# Patient Record
Sex: FEMALE | URBAN - METROPOLITAN AREA
[De-identification: names, ages, dates, MRNs, and addresses within clinical notes are randomized per-mention and may not be internally consistent; named-entity substitution may affect disease eponyms.]

---

## 2018-11-19 ENCOUNTER — EMERGENCY (EMERGENCY)
Facility: HOSPITAL | Age: 23
LOS: 1 days | Discharge: HOME | End: 2018-11-19
Attending: EMERGENCY MEDICINE | Admitting: EMERGENCY MEDICINE
Payer: SELF-PAY

## 2018-11-19 VITALS
RESPIRATION RATE: 16 BRPM | SYSTOLIC BLOOD PRESSURE: 122 MMHG | DIASTOLIC BLOOD PRESSURE: 72 MMHG | TEMPERATURE: 98 F | HEART RATE: 97 BPM | OXYGEN SATURATION: 100 %

## 2018-11-19 PROCEDURE — 99283 EMERGENCY DEPT VISIT LOW MDM: CPT

## 2018-11-19 NOTE — ED PROVIDER NOTE - PROGRESS NOTE DETAILS
BRENDA Samuel: Received sign out from BRENDA ROBERTSON to reassess patient and follow up on dental rec'. Per dental, fever likely 2/2 eruption molars. Pt also has gingivitis, recommend Motrin. Will dc w/ oral surgery follow up

## 2018-11-19 NOTE — ED ADULT TRIAGE NOTE - CHIEF COMPLAINT QUOTE
pt comes to ED for dental pain since friday. pt thinks her wisdom teeth is coming out. pt VSS pt appears comfortable NAD

## 2018-11-19 NOTE — ED PROVIDER NOTE - OBJECTIVE STATEMENT
24 y/o F w/ no significant PMHx presents to ED c/o x5days of intermittent fevers Tmax 103F as well as worsening BL dental pain radiating throughout her head. States pain is worse on the rt side. Pt taking Tylenol and Motrin; last at 4:00PM. Denies other complaints. NKDA. No regular medications.

## 2018-11-19 NOTE — ED PROVIDER NOTE - MEDICAL DECISION MAKING DETAILS
24 y/o F w/ dental pain and intermittent fevers. Plan -Have [facility] dental evaluate for dental abscess.

## 2018-11-19 NOTE — PROGRESS NOTE ADULT - SUBJECTIVE AND OBJECTIVE BOX
Patient is a 23y old  Female who presents with a chief complaint of wisdom tooth pain in LL.     HPI: Pt. presents to ED with mom complaining of wisdom tooth pain in LL and UL. Pt. states pain started last Wednesday and worsened on Thursday. She states she has had a fever since Thursday, high temp of 103' F. She states the pain is constant and throbbing. She states it is worse with chewing and cold air and drinks; states pain is slightly relieved with tylenol and motrin. Pt. states she has not been to a dentist in about 2 years. Pt. denies difficulty breathing or swallowing.       PAST MEDICAL & SURGICAL HISTORY: none      MEDICATIONS  (STANDING): none    MEDICATIONS  (PRN): none      Allergies    *Last Dental Visit: over 2 years ago    Vital Signs Last 24 Hrs  T(C): 36.8 (19 Nov 2018 17:39), Max: 36.8 (19 Nov 2018 17:39)  T(F): 98.2 (19 Nov 2018 17:39), Max: 98.2 (19 Nov 2018 17:39)  HR: 97 (19 Nov 2018 17:39) (97 - 97)  BP: 122/72 (19 Nov 2018 17:39) (122/72 - 122/72)  BP(mean): --  RR: 16 (19 Nov 2018 17:39) (16 - 16)  SpO2: 100% (19 Nov 2018 17:39) (100% - 100%)    EOE:  TMJ ( -  ) clicks                    ( -   ) pops                    ( -   ) crepitus             Mandible <<ltd ROM; about 10mm on own, 20mm assisted.              Facial bones and MOM <<grossly intact>>             ( +  ) trismus             ( +  ) LAD - R and L submandibular LAD appreciated             ( -  ) swelling             ( -  ) asymmetry             (  + ) palpation - tender to palpation over LR angle of mandible             (  - ) SOB             ( -  ) dysphagia             ( -  ) LOC    IOE:  <<permanent>> dentition: <<grossly intact>>           hard/soft palate:  WNL           tongue/FOM <<WNL>>           labial/buccal mucosa <<WNL>>           ( -  ) percussion           ( +  ) palpation - B and L gingiva tender to palpation in LR posterior; B and palatal gingiva tender to palpation in UR posterior           ( -  ) swelling   No caries visible clinically, no mobility. Generalized gingivitis.   Fully erupted #1 not in occlusion with opposing dentition or soft tissue. #16, 17, 32 not present clinically. No soft tissue overlying erupted teeth.     *DENTAL RADIOGRAPHS: Panorex - Permanent dentition, grossly intact. Unerupted #16, 17, 32. #17 mesially impacted. #32 slightly mesially impacted. No PAP, no caries visible.     ASSESSMENT: Erupting impacted #32 with associated pain. No signs of acute infection at this time.     PROCEDURE:  EOE, IOE, rads. Discussed findings with patient. No treatment indicated at this time. Recommended f/u with outpatient OMFS for extraction of wisdom teeth. All questions answered.     RECOMMENDATIONS:  1) Soft diet, Motrin for pain.   2) Dental F/U with outpatient dentist for comprehensive dental care & with outpatient [facility] for extraction of 3rd molars.   3) If any difficulty swallowing/breathing, fever occur, return to ED.     Randa Stewart, DMD; [id number]

## 2018-11-19 NOTE — ED PROVIDER NOTE - NS_ ATTENDINGSCRIBEDETAILS _ED_A_ED_FT
Dr. Farrar:  I personally performed the services described in the documentation, reviewed the documentation recorded by the scribe in my presence and it accurately and completely records my words and action. The scribe's documentation has been prepared under my direction and personally reviewed by me in its entirety. I confirm that the note above accurately reflects all work, treatment, procedures, and medical decision making performed by me.

## 2018-11-19 NOTE — ED PROVIDER NOTE - NSFOLLOWUPINSTRUCTIONS_ED_ALL_ED_FT
See your primary care doctor within 24-48 hours. Follow up with the oral surgeon this week for further evaluation and extraction of your wisdom teeth. Take Motrin 600mg every 8hrs with food for pain or fever. Return to the ER for worsening symptoms or any other concerns.

## 2021-12-05 NOTE — ED PROVIDER NOTE - DISCHARGE REVIEW MATERIAL PRESENTED
. Scribe Attestation (For Scribes USE Only)... Attending Attestation (For Attendings USE Only).../Scribe Attestation (For Scribes USE Only)...

## 2023-01-31 PROBLEM — Z00.00 ENCOUNTER FOR PREVENTIVE HEALTH EXAMINATION: Status: ACTIVE | Noted: 2023-01-31

## 2023-02-01 ENCOUNTER — NON-APPOINTMENT (OUTPATIENT)
Age: 28
End: 2023-02-01

## 2023-02-01 ENCOUNTER — APPOINTMENT (OUTPATIENT)
Dept: CARDIOLOGY | Facility: CLINIC | Age: 28
End: 2023-02-01
Payer: COMMERCIAL

## 2023-02-01 VITALS
HEART RATE: 98 BPM | DIASTOLIC BLOOD PRESSURE: 72 MMHG | SYSTOLIC BLOOD PRESSURE: 115 MMHG | WEIGHT: 114 LBS | BODY MASS INDEX: 20.2 KG/M2 | HEIGHT: 63 IN | OXYGEN SATURATION: 99 %

## 2023-02-01 DIAGNOSIS — R00.2 PALPITATIONS: ICD-10-CM

## 2023-02-01 DIAGNOSIS — Z84.89 FAMILY HISTORY OF OTHER SPECIFIED CONDITIONS: ICD-10-CM

## 2023-02-01 DIAGNOSIS — Z78.9 OTHER SPECIFIED HEALTH STATUS: ICD-10-CM

## 2023-02-01 DIAGNOSIS — Z82.49 FAMILY HISTORY OF ISCHEMIC HEART DISEASE AND OTHER DISEASES OF THE CIRCULATORY SYSTEM: ICD-10-CM

## 2023-02-01 PROCEDURE — 93000 ELECTROCARDIOGRAM COMPLETE: CPT

## 2023-02-01 PROCEDURE — 99204 OFFICE O/P NEW MOD 45 MIN: CPT

## 2023-02-03 ENCOUNTER — APPOINTMENT (OUTPATIENT)
Dept: CARDIOLOGY | Facility: CLINIC | Age: 28
End: 2023-02-03
Payer: COMMERCIAL

## 2023-02-03 ENCOUNTER — LABORATORY RESULT (OUTPATIENT)
Age: 28
End: 2023-02-03

## 2023-02-03 PROCEDURE — 93306 TTE W/DOPPLER COMPLETE: CPT

## 2023-02-05 NOTE — DISCUSSION/SUMMARY
[FreeTextEntry1] : 27 year woman with a history as listed presents for an initial cardiac evaluation. \par Pierre has noted increased heart rate with intermittent sensations of palpitations that somewhat appear arrhythmic in nature.  Her EKG did not show any signs of short OH interval or ectopy.  She will get a Zio Patch to rule out arrhythmias. She will get a 2d echo to assess for any  new structural heart disease, changes in valvular and ventricular function. \par Of concern is her profound weight loss over the last year.  She has signs of thyroid issues. She will have her baseline lab work, including lipids, done prior to the next visit. \par Exercise and diet counseling was performed in order to reduce her future cardiovascular risk. \par She will followup with me in 3-6 months or sooner if necessary.  [EKG obtained to assist in diagnosis and management of assessed problem(s)] : EKG obtained to assist in diagnosis and management of assessed problem(s)

## 2023-02-05 NOTE — HISTORY OF PRESENT ILLNESS
[FreeTextEntry1] : 27 year old woman with no PMH present initial cardiac. \par \par She noted that she started to have palpitations that would occur randomly, occurring daily lasting for about 2-3 minutes, that sudden start and stopped suddenly. She is relatively sedentary. She   denies any chest pain, PND, orthopnea, lower extremity edema, near syncope, syncope, strokelike symptoms. She had unintentional weight loss of 25 lbs in the last year. \par Her menstrual cycle has been normal.

## 2023-02-07 LAB
ALBUMIN SERPL ELPH-MCNC: 3.9 G/DL
ALP BLD-CCNC: 179 U/L
ALT SERPL-CCNC: 37 U/L
ANION GAP SERPL CALC-SCNC: 10 MMOL/L
AST SERPL-CCNC: 21 U/L
BASOPHILS # BLD AUTO: 0.01 K/UL
BASOPHILS NFR BLD AUTO: 0.2 %
BILIRUB SERPL-MCNC: 0.4 MG/DL
BUN SERPL-MCNC: 10 MG/DL
CALCIUM SERPL-MCNC: 10.1 MG/DL
CHLORIDE SERPL-SCNC: 107 MMOL/L
CHOLEST SERPL-MCNC: 131 MG/DL
CO2 SERPL-SCNC: 23 MMOL/L
CREAT SERPL-MCNC: 0.41 MG/DL
EGFR: 136 ML/MIN/1.73M2
EOSINOPHIL # BLD AUTO: 0.16 K/UL
EOSINOPHIL NFR BLD AUTO: 3.2 %
ESTIMATED AVERAGE GLUCOSE: 97 MG/DL
FOLATE SERPL-MCNC: 12.4 NG/ML
GLUCOSE SERPL-MCNC: 95 MG/DL
HBA1C MFR BLD HPLC: 5 %
HCT VFR BLD CALC: 41.2 %
HDLC SERPL-MCNC: 69 MG/DL
HGB BLD-MCNC: 13.1 G/DL
IMM GRANULOCYTES NFR BLD AUTO: 0.2 %
LDLC SERPL CALC-MCNC: 52 MG/DL
LYMPHOCYTES # BLD AUTO: 1.75 K/UL
LYMPHOCYTES NFR BLD AUTO: 34.8 %
MAGNESIUM SERPL-MCNC: 1.8 MG/DL
MAN DIFF?: NORMAL
MCHC RBC-ENTMCNC: 27.5 PG
MCHC RBC-ENTMCNC: 31.8 GM/DL
MCV RBC AUTO: 86.6 FL
MONOCYTES # BLD AUTO: 0.38 K/UL
MONOCYTES NFR BLD AUTO: 7.6 %
NEUTROPHILS # BLD AUTO: 2.72 K/UL
NEUTROPHILS NFR BLD AUTO: 54 %
NONHDLC SERPL-MCNC: 63 MG/DL
PLATELET # BLD AUTO: 248 K/UL
POTASSIUM SERPL-SCNC: 4.4 MMOL/L
PROT SERPL-MCNC: 6.2 G/DL
RBC # BLD: 4.76 M/UL
RBC # FLD: 12.8 %
SODIUM SERPL-SCNC: 140 MMOL/L
TRIGL SERPL-MCNC: 53 MG/DL
TSH SERPL-ACNC: <0.01 UIU/ML
VIT B12 SERPL-MCNC: 425 PG/ML
WBC # FLD AUTO: 5.03 K/UL

## 2023-02-08 ENCOUNTER — NON-APPOINTMENT (OUTPATIENT)
Age: 28
End: 2023-02-08

## 2023-03-01 ENCOUNTER — APPOINTMENT (OUTPATIENT)
Dept: ENDOCRINOLOGY | Facility: CLINIC | Age: 28
End: 2023-03-01
Payer: SUBSIDIZED

## 2023-03-01 VITALS
SYSTOLIC BLOOD PRESSURE: 99 MMHG | OXYGEN SATURATION: 100 % | WEIGHT: 115 LBS | HEART RATE: 70 BPM | BODY MASS INDEX: 20.37 KG/M2 | DIASTOLIC BLOOD PRESSURE: 62 MMHG

## 2023-03-01 PROCEDURE — 99204 OFFICE O/P NEW MOD 45 MIN: CPT

## 2023-03-02 NOTE — PHYSICAL EXAM
[Alert] : alert [Well Nourished] : well nourished [No Acute Distress] : no acute distress [Well Developed] : well developed [Normal Sclera/Conjunctiva] : normal sclera/conjunctiva [EOMI] : extra ocular movement intact [No Proptosis] : no proptosis [No Lid Lag] : no lid lag [Normal Oropharynx] : the oropharynx was normal [No Respiratory Distress] : no respiratory distress [No Accessory Muscle Use] : no accessory muscle use [Clear to Auscultation] : lungs were clear to auscultation bilaterally [Normal S1, S2] : normal S1 and S2 [Normal Rate] : heart rate was normal [Regular Rhythm] : with a regular rhythm [No Edema] : no peripheral edema [Pedal Pulses Normal] : the pedal pulses are present [Normal Bowel Sounds] : normal bowel sounds [Not Tender] : non-tender [Not Distended] : not distended [Soft] : abdomen soft [Normal Anterior Cervical Nodes] : no anterior cervical lymphadenopathy [Normal Posterior Cervical Nodes] : no posterior cervical lymphadenopathy [No Spinal Tenderness] : no spinal tenderness [Spine Straight] : spine straight [No Stigmata of Cushings Syndrome] : no stigmata of Cushings Syndrome [Normal Gait] : normal gait [Normal Strength/Tone] : muscle strength and tone were normal [No Rash] : no rash [Acanthosis Nigricans] : no acanthosis nigricans [Oriented x3] : oriented to person, place, and time [de-identified] : thyroid diffusely enlarged [de-identified] : mild tremors

## 2023-03-02 NOTE — HISTORY OF PRESENT ILLNESS
[FreeTextEntry1] : Ms. DARREL FERNANDEZ is a 27 year old female  with no past medical history who presents for management of her thyroid disease. Patient has been having palpitations, diarrhea, 25 lb weight loss, heat intolerance, tremors. She saw cardiology who found her TSH to be undetectable. She was started on methimazole 5  mg  BID and Metoprolol ER 25 mg QD. Her symptoms have been improving. She is a RN in the coronary ICU. Her mother has thyroid cancer. No other thyroid or autoimmune disease in the family. She has noted that her eyes appear larger but she has large eyes in general and she has lost weight on her eyes.\par \par \par

## 2023-03-02 NOTE — ASSESSMENT
[FreeTextEntry1] : 27 year old female  with no past medical history who presents for management of her thyroid disease\par \par 1. Hyperthyroidism\par Likely Graves Disease given presentation \par Will check for antibodies\par WIll send for US\par Will check current levels\par Discussed optho evaluation\par Discussed medication vs Radioactive iodine vs surgery\par

## 2023-03-31 ENCOUNTER — RX RENEWAL (OUTPATIENT)
Age: 28
End: 2023-03-31

## 2023-06-13 ENCOUNTER — APPOINTMENT (OUTPATIENT)
Dept: ENDOCRINOLOGY | Facility: CLINIC | Age: 28
End: 2023-06-13

## 2023-07-18 ENCOUNTER — OUTPATIENT (OUTPATIENT)
Dept: OUTPATIENT SERVICES | Facility: HOSPITAL | Age: 28
LOS: 1 days | End: 2023-07-18
Payer: COMMERCIAL

## 2023-07-18 ENCOUNTER — APPOINTMENT (OUTPATIENT)
Dept: ULTRASOUND IMAGING | Facility: CLINIC | Age: 28
End: 2023-07-18
Payer: COMMERCIAL

## 2023-07-18 ENCOUNTER — NON-APPOINTMENT (OUTPATIENT)
Age: 28
End: 2023-07-18

## 2023-07-18 DIAGNOSIS — E05.90 THYROTOXICOSIS, UNSPECIFIED WITHOUT THYROTOXIC CRISIS OR STORM: ICD-10-CM

## 2023-07-18 PROCEDURE — 76536 US EXAM OF HEAD AND NECK: CPT

## 2023-07-18 PROCEDURE — 76536 US EXAM OF HEAD AND NECK: CPT | Mod: 26

## 2023-08-02 ENCOUNTER — APPOINTMENT (OUTPATIENT)
Dept: ENDOCRINOLOGY | Facility: CLINIC | Age: 28
End: 2023-08-02

## 2023-08-22 LAB
T3 SERPL-MCNC: 253 NG/DL
T4 FREE SERPL-MCNC: 2.2 NG/DL
THYROGLOB AB SERPL-ACNC: 187 IU/ML
THYROPEROXIDASE AB SERPL IA-ACNC: 1074 IU/ML
TSH RECEPTOR AB: <1.1 IU/L
TSH SERPL-ACNC: <0.01 UIU/ML
TSI ACT/NOR SER: 3.37 IU/L

## 2023-10-30 ENCOUNTER — APPOINTMENT (OUTPATIENT)
Dept: ENDOCRINOLOGY | Facility: CLINIC | Age: 28
End: 2023-10-30
Payer: COMMERCIAL

## 2023-10-30 VITALS
DIASTOLIC BLOOD PRESSURE: 62 MMHG | HEIGHT: 63 IN | BODY MASS INDEX: 24.45 KG/M2 | OXYGEN SATURATION: 100 % | RESPIRATION RATE: 18 BRPM | HEART RATE: 72 BPM | WEIGHT: 138 LBS | SYSTOLIC BLOOD PRESSURE: 100 MMHG

## 2023-10-30 DIAGNOSIS — E05.90 THYROTOXICOSIS, UNSPECIFIED W/OUT THYROTOXIC CRISIS OR STORM: ICD-10-CM

## 2023-10-30 PROCEDURE — 99214 OFFICE O/P EST MOD 30 MIN: CPT

## 2023-10-30 RX ORDER — METOPROLOL SUCCINATE 25 MG/1
25 TABLET, EXTENDED RELEASE ORAL DAILY
Qty: 30 | Refills: 3 | Status: DISCONTINUED | COMMUNITY
Start: 2023-02-06 | End: 2023-10-30

## 2023-11-14 RX ORDER — METHIMAZOLE 5 MG/1
5 TABLET ORAL
Qty: 180 | Refills: 1 | Status: ACTIVE | COMMUNITY
Start: 2023-02-06 | End: 1900-01-01

## 2024-01-08 ENCOUNTER — APPOINTMENT (OUTPATIENT)
Dept: ENDOCRINOLOGY | Facility: CLINIC | Age: 29
End: 2024-01-08

## 2024-01-18 ENCOUNTER — APPOINTMENT (OUTPATIENT)
Dept: ENDOCRINOLOGY | Facility: CLINIC | Age: 29
End: 2024-01-18

## 2024-01-26 ENCOUNTER — APPOINTMENT (OUTPATIENT)
Dept: INTERNAL MEDICINE | Facility: CLINIC | Age: 29
End: 2024-01-26

## 2024-05-21 ENCOUNTER — RX RENEWAL (OUTPATIENT)
Age: 29
End: 2024-05-21

## 2024-09-25 ENCOUNTER — LABORATORY RESULT (OUTPATIENT)
Age: 29
End: 2024-09-25

## 2024-10-07 ENCOUNTER — APPOINTMENT (OUTPATIENT)
Dept: ENDOCRINOLOGY | Facility: CLINIC | Age: 29
End: 2024-10-07
Payer: SELF-PAY

## 2024-10-07 VITALS
WEIGHT: 136 LBS | HEART RATE: 65 BPM | HEIGHT: 63 IN | SYSTOLIC BLOOD PRESSURE: 108 MMHG | OXYGEN SATURATION: 100 % | DIASTOLIC BLOOD PRESSURE: 70 MMHG | BODY MASS INDEX: 24.1 KG/M2

## 2024-10-07 DIAGNOSIS — E05.90 THYROTOXICOSIS, UNSPECIFIED W/OUT THYROTOXIC CRISIS OR STORM: ICD-10-CM

## 2024-10-07 PROCEDURE — 99214 OFFICE O/P EST MOD 30 MIN: CPT

## 2024-10-11 LAB
THYROGLOB AB SERPL-ACNC: 316 IU/ML
THYROPEROXIDASE AB SERPL IA-ACNC: 257 IU/ML
TSI ACT/NOR SER: 0.44 IU/L

## 2025-09-08 ENCOUNTER — APPOINTMENT (OUTPATIENT)
Dept: HUMAN REPRODUCTION | Facility: CLINIC | Age: 30
End: 2025-09-08

## 2025-09-18 ENCOUNTER — APPOINTMENT (OUTPATIENT)
Dept: HUMAN REPRODUCTION | Facility: CLINIC | Age: 30
End: 2025-09-18
Payer: COMMERCIAL

## 2025-09-18 PROCEDURE — 99205 OFFICE O/P NEW HI 60 MIN: CPT | Mod: 25

## 2025-09-18 PROCEDURE — 76830 TRANSVAGINAL US NON-OB: CPT

## 2025-09-18 PROCEDURE — 36415 COLL VENOUS BLD VENIPUNCTURE: CPT
